# Patient Record
Sex: FEMALE | URBAN - METROPOLITAN AREA
[De-identification: names, ages, dates, MRNs, and addresses within clinical notes are randomized per-mention and may not be internally consistent; named-entity substitution may affect disease eponyms.]

---

## 2017-10-09 ENCOUNTER — THERAPY VISIT (OUTPATIENT)
Dept: PHYSICAL THERAPY | Facility: CLINIC | Age: 32
End: 2017-10-09
Payer: COMMERCIAL

## 2017-10-09 DIAGNOSIS — M25.551 BILATERAL HIP PAIN: Primary | ICD-10-CM

## 2017-10-09 DIAGNOSIS — M25.552 BILATERAL HIP PAIN: Primary | ICD-10-CM

## 2017-10-09 PROCEDURE — 97161 PT EVAL LOW COMPLEX 20 MIN: CPT | Mod: GP | Performed by: PHYSICAL THERAPIST

## 2017-10-09 PROCEDURE — 97112 NEUROMUSCULAR REEDUCATION: CPT | Mod: GP | Performed by: PHYSICAL THERAPIST

## 2017-10-09 ASSESSMENT — ACTIVITIES OF DAILY LIVING (ADL)
GOING_DOWN_1_FLIGHT_OF_STAIRS: NO DIFFICULTY AT ALL
RECREATIONAL_ACTIVITIES: MODERATE DIFFICULTY
GOING_UP_1_FLIGHT_OF_STAIRS: MODERATE DIFFICULTY
STEPPING_UP_AND_DOWN_CURBS: NO DIFFICULTY AT ALL
DEEP_SQUATTING: NO DIFFICULTY AT ALL
HOS_ADL_ITEM_SCORE_TOTAL: 55
HOS_ADL_SCORE(%): 80.88
STANDING_FOR_15_MINUTES: NO DIFFICULTY AT ALL
WALKING_INITIALLY: EXTREME DIFFICULTY
WALKING_UP_STEEP_HILLS: MODERATE DIFFICULTY
GETTING_INTO_AND_OUT_OF_AN_AVERAGE_CAR: NO DIFFICULTY AT ALL
HOW_WOULD_YOU_RATE_YOUR_CURRENT_LEVEL_OF_FUNCTION_DURING_YOUR_USUAL_ACTIVITIES_OF_DAILY_LIVING_FROM_0_TO_100_WITH_100_BEING_YOUR_LEVEL_OF_FUNCTION_PRIOR_TO_YOUR_HIP_PROBLEM_AND_0_BEING_THE_INABILITY_TO_PERFORM_ANY_OF_YOUR_USUAL_DAILY_ACTIVITIES?: 70
GETTING_INTO_AND_OUT_OF_A_BATHTUB: NO DIFFICULTY AT ALL
TWISTING/PIVOTING_ON_INVOLVED_LEG: NO DIFFICULTY AT ALL
WALKING_APPROXIMATELY_10_MINUTES: MODERATE DIFFICULTY
HOS_ADL_HIGHEST_POTENTIAL_SCORE: 68
HEAVY_WORK: NO DIFFICULTY AT ALL
LIGHT_TO_MODERATE_WORK: SLIGHT DIFFICULTY
SITTING_FOR_15_MINUTES: MODERATE DIFFICULTY
PUTTING_ON_SOCKS_AND_SHOES: NO DIFFICULTY AT ALL
WALKING_DOWN_STEEP_HILLS: NO DIFFICULTY AT ALL
ROLLING_OVER_IN_BED: NO DIFFICULTY AT ALL
HOS_ADL_COUNT: 17
WALKING_15_MINUTES_OR_GREATER: SLIGHT DIFFICULTY

## 2017-10-09 NOTE — LETTER
[facility]  [address] [address]  [phone]    2017    Re: Yumiko Arevalo   :   1985  MRN:  [id number]   REFERRING PHYSICIAN:   Maulik DAVENPORT [facility] PHYSICAL THERAPY [facility]    Date of Initial Evaluation:  10/9/2017  Visits:  Rxs Used: 1  Reason for Referral:  Bilateral hip pain    [facility] [place]  Physical Therapy Initial Examination/Evaluation  2017    Yumiko Arevalo is a 32 year old  female referred to physical therapy by Dr. Tirado for treatment of Hip pain with Precautions/Restrictions/MD instructions none    Subjective:  Referring MD visit date: 10/3/17  DOI/onset: Patient notes onset of L hip and leg pain in 2017; Patient also has similar pain on R side too, which began in 2017  Mechanism of injury: Insidious onset of pain  DOS n/a  Previous treatment: none  Imaging: xray of hips - negative  Chief Complaint:   Pain with sitting, running, walking, sports   Pain: rest 4 /10, activity 7/10 R side - anterior thigh, anterior lower leg;L side - glute, posterior thigh to knee Described as: aching, sharp Alleviated by: ice, ibuprofen Frequency: intermittent Progression of symptoms since initial onset: worsening Time of day when pain is worse: morning  Sleeping: mildly affected  Social history:       Occupation: Post doc in [employer]  Job duties:  Sitting, microscope work    Current HEP/exercise regimen: ultimate frisbee (1x/week), running  Patient's goals are Reduce pain  Pertinent PMH: Celiac disease   General Health Reported by Patient: Excellent  Return to MD:  PRN     Objective:  Gait:    Gait Type:  Normal     Hip Evaluation  HIP AROM:  AROM:    Left Hip:     Normal    Re: Yumiko Arevalo   :   1985    Right Hip:   Normal  Hip PROM:  Hip PROM:  Left Hip:    Normal  Right Hip:  Normal  Hip Strength:    Flexion:   Left: 5/5   -  Pain:   Right: 5/5   -  Pain:              Extension:  Left: 5-/5  -  Pain:Right: 5-/5    -  Pain:    Abduction:  Left: 4+/5    -   Pain:Right: 4+/5   -   Pain:  Internal Rotation:  Left: 5-/5   -   Pain:Right: 5-/5   -  Pain:  External Rotation:  Left: 5-/5   -  Pain:  Right: 5-/5   -  Pain:  Knee Flexion:  Left: 5/5   -  Pain:Right: 5/5   -  Pain:  Knee Extension:  Left: 5/5   -  Pain:Right: 5/5    -  Pain:  Hip Special Testing:    Left hip negative for the following special tests:  Piriformis; Beronica; Fadir/Labrum or SLR  Right hip negative for the following special tests:  Piriformis; Beronica; Fadir/Labrum or SLR    Hip Palpation:  Normal   Functional Testing:    Quad:    Single leg squat:   Left:    Femoral IR  Right:   Femoral IR  Bilateral leg squat:  Normal control   Proprioception:  Stork balance test:    Left:    WNL  Right:  WNL  % of Uninvolved:  100%    Assessment/Plan:    Patient is a 32 year old female with lumbar and both sides hip complaints.    Patient has the following significant findings with corresponding treatment plan.                Diagnosis 1:  Lumbar etiology vs hip etiology  Pain -  hot/cold therapy, US, electric stimulation, mechanical traction, manual therapy, splint/taping/bracing/orthotics, self management, education, directional preference exercise and home program  Decreased strength - therapeutic exercise and therapeutic activities  Impaired balance - neuro re-education and therapeutic activities  Decreased proprioception - neuro re-education and therapeutic activities  Impaired muscle performance - neuro re-education  Decreased function - therapeutic activities    Therapy Evaluation Codes:   1) History comprised of:   Personal factors that impact the plan of care:      None.    Comorbidity factors that impact the plan of care are:      None.     Medications impacting care: None.      Re: Yumiko Arevalo   :   1985    2) Examination of Body Systems comprised of:   Body structures and  functions that impact the plan of care:      Hip and Lumbar spine.   Activity limitations that impact the plan of care are:      Running, Sitting and Walking.  3) Clinical presentation characteristics are:   Stable/Uncomplicated.  4) Decision-Making    Low complexity using standardized patient assessment instrument and/or measureable assessment of functional outcome.  Cumulative Therapy Evaluation is: Low complexity.  Previous and current functional limitations:  (See Goal Flow Sheet for this information)    Short term and Long term goals: (See Goal Flow Sheet for this information)   Communication ability:  Patient appears to be able to clearly communicate and understand verbal and written communication and follow directions correctly.  Treatment Explanation - The following has been discussed with the patient:   RX ordered/plan of care  Anticipated outcomes  Possible risks and side effects  This patient would benefit from PT intervention to resume normal activities.   Rehab potential is good.  Frequency:  1 X week, once daily  Duration:  for 6 weeks  Discharge Plan:  Achieve all LTG.  Independent in home treatment program.  Reach maximal therapeutic benefit.    Thank you for your referral.    INQUIRIES  Therapist: Jeanette Guaman DPT, [facility] PHYSICAL THERAPY [address] [address]  Phone: [phone]

## 2017-10-09 NOTE — PROGRESS NOTES
Subjective:    [facility]                  [facility]  Physical Therapy Initial Examination/Evaluation  October 9, 2017    Yumiko Arevalo is a 32 year old  female referred to physical therapy by Dr. Tirado for treatment of Hip pain with Precautions/Restrictions/MD instructions none      Subjective:  Referring MD visit date: 10/3/17  DOI/onset: Patient notes onset of L hip and leg pain in August 2017; Patient also has similar pain on R side too, which began in September 2017  Mechanism of injury: Insidious onset of pain  DOS n/a  Previous treatment: none  Imaging: xray of hips - negative  Chief Complaint:   Pain with sitting, running, walking, sports   Pain: rest 4 /10, activity 7/10 R side - anterior thigh, anterior lower leg;L side - glute, posterior thigh to knee Described as: aching, sharp Alleviated by: ice, ibuprofen Frequency: intermittent Progression of symptoms since initial onset: worsening Time of day when pain is worse: morning  Sleeping: mildly affected  Social history:       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, microscope work    Current HEP/exercise regimen: ultimate frisbee (1x/week), running  Patient's goals are Reduce pain    Pertinent PMH: Celiac disease   General Health Reported by Patient: Excellent  Return to MD:  PRN     Objective:      Gait:    Gait Type:  Normal                                                      Hip Evaluation  HIP AROM:  AROM:    Left Hip:     Normal    Right Hip:   Normal                  Hip PROM:  Hip PROM:  Left Hip:    Normal  Right Hip:  Normal                          Hip Strength:    Flexion:   Left: 5/5   -  Pain:  Right: 5/5   -  Pain:                    Extension:  Left: 5-/5  -  Pain:Right: 5-/5    -  Pain:    Abduction:  Left: 4+/5    -   Pain:Right: 4+/5   -   Pain:    Internal Rotation:  Left: 5-/5   -   Pain:Right: 5-/5   -  Pain:  External Rotation:  Left: 5-/5   -  Pain:  Right:  5-/5   -  Pain:  Knee Flexion:  Left: 5/5   -  Pain:Right: 5/5   -  Pain:  Knee Extension:  Left: 5/5   -  Pain:Right: 5/5    -  Pain:        Hip Special Testing:      Left hip negative for the following special tests:  Piriformis; Beronica; Fadir/Labrum or SLR  Right hip negative for the following special tests:  Piriformis; Beronica; Fadir/Labrum or SLR    Hip Palpation:  Normal         Functional Testing:          Quad:    Single leg squat:   Left:    Femoral IR  Right:   Femoral IR    Bilateral leg squat:  Normal control     Proprioception:    Stork balance test:    Left:    WNL  Right:  WNL  % of Uninvolved:  100%               General     ROS    Assessment/Plan:      Patient is a 32 year old female with lumbar and both sides hip complaints.    Patient has the following significant findings with corresponding treatment plan.                Diagnosis 1:  Lumbar etiology vs hip etiology  Pain -  hot/cold therapy, US, electric stimulation, mechanical traction, manual therapy, splint/taping/bracing/orthotics, self management, education, directional preference exercise and home program  Decreased strength - therapeutic exercise and therapeutic activities  Impaired balance - neuro re-education and therapeutic activities  Decreased proprioception - neuro re-education and therapeutic activities  Impaired muscle performance - neuro re-education  Decreased function - therapeutic activities    Therapy Evaluation Codes:   1) History comprised of:   Personal factors that impact the plan of care:      None.    Comorbidity factors that impact the plan of care are:      None.     Medications impacting care: None.  2) Examination of Body Systems comprised of:   Body structures and functions that impact the plan of care:      Hip and Lumbar spine.   Activity limitations that impact the plan of care are:      Running, Sitting and Walking.  3) Clinical presentation characteristics are:   Stable/Uncomplicated.  4) Decision-Making    Low  complexity using standardized patient assessment instrument and/or measureable assessment of functional outcome.  Cumulative Therapy Evaluation is: Low complexity.    Previous and current functional limitations:  (See Goal Flow Sheet for this information)    Short term and Long term goals: (See Goal Flow Sheet for this information)     Communication ability:  Patient appears to be able to clearly communicate and understand verbal and written communication and follow directions correctly.  Treatment Explanation - The following has been discussed with the patient:   RX ordered/plan of care  Anticipated outcomes  Possible risks and side effects  This patient would benefit from PT intervention to resume normal activities.   Rehab potential is good.    Frequency:  1 X week, once daily  Duration:  for 6 weeks  Discharge Plan:  Achieve all LTG.  Independent in home treatment program.  Reach maximal therapeutic benefit.    Please refer to the daily flowsheet for treatment today, total treatment time and time spent performing 1:1 timed codes.

## 2017-10-09 NOTE — MR AVS SNAPSHOT
"              After Visit Summary   10/9/2017    Yumiko Arevalo    MRN: [id number]           Patient Information     Date Of Birth          1985        Visit Information        Provider Department      10/9/2017 9:20 AM Jeanette Guaman PT [facility]        Today's Diagnoses     Bilateral hip pain    -  1       Follow-ups after your visit        Your next 10 appointments already scheduled     Oct 25, 2017 11:00 AM CDT   [facility] Extremity with [facility] ([facility])    [address] [place] [address] [phone]              Who to contact     If you have questions or need follow up information about today's clinic visit or your schedule please contact [facility] PHYSICAL THERAPY [place] directly at [phone].  Normal or non-critical lab and imaging results will be communicated to you by MyChart, letter or phone within 4 business days after the clinic has received the results. If you do not hear from us within 7 days, please contact the clinic through [employer] or phone. If you have a critical or abnormal lab result, we will notify you by phone as soon as possible.  Submit refill requests through [employer] or call your pharmacy and they will forward the refill request to us. Please allow 3 business days for your refill to be completed.          Additional Information About Your Visit        MyChart Information     [employer] lets you send messages to your doctor, view your test results, renew your prescriptions, schedule appointments and more. To sign up, go to [email or web address] . Click on \"Log in\" on the left side of the screen, which will take you to the Welcome page. Then click on \"Sign up Now\" on the right side of the page.     You will be asked to enter the access code listed below, as well as some personal information. Please follow the directions to create your username and password.   "   Your access code is: [email or web address]  Expires: 1/3/2018  6:31 AM     Your access code will  in 90 days. If you need help or a new code, please call your [place] clinic or [phone].        Care EveryWhere ID     This is your Care EveryWhere ID. This could be used by other organizations to access your [place] medical records  [id number]         Blood Pressure from Last 3 Encounters:   No data found for BP    Weight from Last 3 Encounters:   No data found for Wt              We Performed the Following     HC PT EVAL, LOW COMPLEXITY     [facility] INITIAL EVAL REPORT     NEUROMUSCULAR RE-EDUCATION        Primary Care Provider    None Specified       No primary provider on file.        Equal Access to Services     [facility]: Hadii darvin Lagunas, ousmane roach, mery klein, roque duncan . So [facility] [phone].    ATENCIÓN: Si habla español, tiene a chaney disposición servicios gratuitos de asistencia lingüística. Llame al [phone].    We comply with applicable federal civil rights laws and [place] laws. We do not discriminate on the basis of race, color, national origin, age, disability, sex, sexual orientation, or gender identity.            Thank you!     Thank you for choosing [facility] PHYSICAL THERAPY [place]  for your care. Our goal is always to provide you with excellent care. Hearing back from our patients is one way we can continue to improve our services. Please take a few minutes to complete the written survey that you may receive in the mail after your visit with us. Thank you!             Your Updated Medication List - Protect others around you: Learn how to safely use, store and throw away your medicines at www.disposemymeds.org.      Notice  As of 10/9/2017 11:19 AM    You have not been prescribed any medications.

## 2017-10-25 ENCOUNTER — THERAPY VISIT (OUTPATIENT)
Dept: PHYSICAL THERAPY | Facility: CLINIC | Age: 32
End: 2017-10-25
Payer: COMMERCIAL

## 2017-10-25 DIAGNOSIS — M25.552 BILATERAL HIP PAIN: ICD-10-CM

## 2017-10-25 DIAGNOSIS — M25.551 BILATERAL HIP PAIN: ICD-10-CM

## 2017-10-25 PROCEDURE — 97112 NEUROMUSCULAR REEDUCATION: CPT | Mod: GP | Performed by: PHYSICAL THERAPIST

## 2017-10-25 PROCEDURE — 97110 THERAPEUTIC EXERCISES: CPT | Mod: GP | Performed by: PHYSICAL THERAPIST

## 2017-11-10 ENCOUNTER — THERAPY VISIT (OUTPATIENT)
Dept: PHYSICAL THERAPY | Facility: CLINIC | Age: 32
End: 2017-11-10
Payer: COMMERCIAL

## 2017-11-10 DIAGNOSIS — M25.552 BILATERAL HIP PAIN: ICD-10-CM

## 2017-11-10 DIAGNOSIS — M25.551 BILATERAL HIP PAIN: ICD-10-CM

## 2017-11-10 PROCEDURE — 97110 THERAPEUTIC EXERCISES: CPT | Mod: GP | Performed by: PHYSICAL THERAPIST

## 2017-11-10 PROCEDURE — 97112 NEUROMUSCULAR REEDUCATION: CPT | Mod: GP | Performed by: PHYSICAL THERAPIST

## 2017-11-10 ASSESSMENT — ACTIVITIES OF DAILY LIVING (ADL)
WALKING_APPROXIMATELY_10_MINUTES: SLIGHT DIFFICULTY
HOS_ADL_SCORE(%): 89.71
WALKING_INITIALLY: SLIGHT DIFFICULTY
STANDING_FOR_15_MINUTES: NO DIFFICULTY AT ALL
GETTING_INTO_AND_OUT_OF_AN_AVERAGE_CAR: NO DIFFICULTY AT ALL
ROLLING_OVER_IN_BED: NO DIFFICULTY AT ALL
RECREATIONAL_ACTIVITIES: SLIGHT DIFFICULTY
LIGHT_TO_MODERATE_WORK: SLIGHT DIFFICULTY
GOING_UP_1_FLIGHT_OF_STAIRS: SLIGHT DIFFICULTY
HOS_ADL_COUNT: 17
WALKING_15_MINUTES_OR_GREATER: SLIGHT DIFFICULTY
HEAVY_WORK: NO DIFFICULTY AT ALL
STEPPING_UP_AND_DOWN_CURBS: NO DIFFICULTY AT ALL
GOING_DOWN_1_FLIGHT_OF_STAIRS: NO DIFFICULTY AT ALL
GETTING_INTO_AND_OUT_OF_A_BATHTUB: NO DIFFICULTY AT ALL
DEEP_SQUATTING: NO DIFFICULTY AT ALL
WALKING_UP_STEEP_HILLS: SLIGHT DIFFICULTY
HOS_ADL_ITEM_SCORE_TOTAL: 61
WALKING_DOWN_STEEP_HILLS: NO DIFFICULTY AT ALL
TWISTING/PIVOTING_ON_INVOLVED_LEG: NO DIFFICULTY AT ALL
HOS_ADL_HIGHEST_POTENTIAL_SCORE: 68
PUTTING_ON_SOCKS_AND_SHOES: NO DIFFICULTY AT ALL
SITTING_FOR_15_MINUTES: MODERATE DIFFICULTY

## 2017-12-13 ENCOUNTER — THERAPY VISIT (OUTPATIENT)
Dept: PHYSICAL THERAPY | Facility: CLINIC | Age: 32
End: 2017-12-13
Payer: COMMERCIAL

## 2017-12-13 DIAGNOSIS — M25.552 BILATERAL HIP PAIN: ICD-10-CM

## 2017-12-13 DIAGNOSIS — M25.551 BILATERAL HIP PAIN: ICD-10-CM

## 2017-12-13 PROCEDURE — 97112 NEUROMUSCULAR REEDUCATION: CPT | Mod: GP | Performed by: PHYSICAL THERAPIST

## 2017-12-13 PROCEDURE — 97110 THERAPEUTIC EXERCISES: CPT | Mod: GP | Performed by: PHYSICAL THERAPIST

## 2017-12-13 NOTE — MR AVS SNAPSHOT
"              After Visit Summary   2017    Yumiko Arevalo    MRN: [id number]           Patient Information     Date Of Birth          1985        Visit Information        Provider Department      2017 9:00 AM Jeanette Guaman PT [facility] Physical [facility]        Today's Diagnoses     Bilateral hip pain           Follow-ups after your visit        Who to contact     If you have questions or need follow up information about today's clinic visit or your schedule please contact [facility] PHYSICAL THERAPY [facility] directly at [phone].  Normal or non-critical lab and imaging results will be communicated to you by [employer], letter or phone within 4 business days after the clinic has received the results. If you do not hear from us within 7 days, please contact the clinic through [employer] or phone. If you have a critical or abnormal lab result, we will notify you by phone as soon as possible.  Submit refill requests through [employer] or call your pharmacy and they will forward the refill request to us. Please allow 3 business days for your refill to be completed.          Additional Information About Your Visit        MyChart Information     [employer] lets you send messages to your doctor, view your test results, renew your prescriptions, schedule appointments and more. To sign up, go to [email or web address] . Click on \"Log in\" on the left side of the screen, which will take you to the Welcome page. Then click on \"Sign up Now\" on the right side of the page.     You will be asked to enter the access code listed below, as well as some personal information. Please follow the directions to create your username and password.     Your access code is: [email or web address]  Expires: 1/3/2018  5:31 AM     Your access code will  in 90 days. If you need help or a new code, please call your [place] clinic or [phone].        Care EveryWhere ID     This is your Care EveryWhere ID. This could be used by other " organizations to access your [facility] medical records  [id number]         Blood Pressure from Last 3 Encounters:   No data found for BP    Weight from Last 3 Encounters:   No data found for Wt              We Performed the Following     NEUROMUSCULAR RE-EDUCATION     THERAPEUTIC EXERCISES        Primary Care Provider    None Specified       No primary provider on file.        Equal Access to Services     [facility] : Hadii aad valarie hadamarao Sorickyali, waaxda luqadaha, qaybta kaalmada adeegyada, waxlizabeth carola pippaluis gonzaleznajmahansel duncan . So [facility] [phone].    ATENCIÓN: Si habla español, tiene a chaney disposición servicios gratuitos de asistencia lingüística. Llame al [phone].    We comply with applicable federal civil rights laws and [place] laws. We do not discriminate on the basis of race, color, national origin, age, disability, sex, sexual orientation, or gender identity.            Thank you!     Thank you for choosing [facility] PHYSICAL THERAPY [facility]  for your care. Our goal is always to provide you with excellent care. Hearing back from our patients is one way we can continue to improve our services. Please take a few minutes to complete the written survey that you may receive in the mail after your visit with us. Thank you!             Your Updated Medication List - Protect others around you: Learn how to safely use, store and throw away your medicines at www.disposemymeds.org.      Notice  As of 12/13/2017  9:33 AM    You have not been prescribed any medications.

## 2017-12-13 NOTE — PROGRESS NOTES
[facility] Subsequent Evaluation  Subjective:    HPI                    Objective:    System    Physical Exam    General     ROS    Assessment/Plan:      DISCHARGE REPORT    Progress reporting period is from 10/7/17 to 12/13/17.       SUBJECTIVE  Subjective: Patient states that her R leg pain has resolved.  Continues to her some pinching pain in her L glute accompanied by L hs and gastroc pain.  She will be moving back to the [place] in ten days.  She will continue her home program independently.    Changes in function:  Yes (See Goal flowsheet attached for changes in current functional level)  Adverse reaction to treatment or activity: None    OBJECTIVE  Changes noted in objective findings:  Yes  Objective: MMT gluteus medius: 5-/5 B.  Excellent DL squat mechanics.     ASSESSMENT/PLAN  Updated problem list and treatment plan: Diagnosis 1:  Bilateral hip vs radicular pain    STG/LTGs have been met or progress has been made towards goals:  Yes (See Goal flow sheet completed today.)  Assessment of Progress: The patient's condition is improving.  Self Management Plans:  Patient has been instructed in a home treatment program.  I have re-evaluated this patient and find that the nature, scope, duration and intensity of the therapy is appropriate for the medical condition of the patient.  Yumiko continues to require the following intervention to meet STG and LTG's:  PT intervention is no longer required to meet STG/LTG.    Recommendations:  This patient is ready to be discharged from therapy and continue their home treatment program.    Please refer to the daily flowsheet for treatment today, total treatment time and time spent performing 1:1 timed codes.